# Patient Record
Sex: FEMALE | Race: WHITE | NOT HISPANIC OR LATINO | URBAN - NONMETROPOLITAN AREA
[De-identification: names, ages, dates, MRNs, and addresses within clinical notes are randomized per-mention and may not be internally consistent; named-entity substitution may affect disease eponyms.]

---

## 2024-07-16 ENCOUNTER — OFFICE VISIT (OUTPATIENT)
Dept: CARDIOLOGY | Facility: CLINIC | Age: 34
End: 2024-07-16
Payer: COMMERCIAL

## 2024-07-16 VITALS
BODY MASS INDEX: 41.04 KG/M2 | HEART RATE: 80 BPM | DIASTOLIC BLOOD PRESSURE: 82 MMHG | SYSTOLIC BLOOD PRESSURE: 130 MMHG | WEIGHT: 223 LBS | HEIGHT: 62 IN | OXYGEN SATURATION: 95 %

## 2024-07-16 DIAGNOSIS — R06.02 SHORTNESS OF BREATH: ICD-10-CM

## 2024-07-16 DIAGNOSIS — R00.2 PALPITATIONS: ICD-10-CM

## 2024-07-16 DIAGNOSIS — R07.2 PRECORDIAL PAIN: Primary | ICD-10-CM

## 2024-07-16 RX ORDER — NITROGLYCERIN 0.4 MG/1
TABLET SUBLINGUAL
Qty: 30 TABLET | Refills: 5 | Status: SHIPPED | OUTPATIENT
Start: 2024-07-16

## 2024-07-16 RX ORDER — MONTELUKAST SODIUM 10 MG/1
10 TABLET ORAL NIGHTLY
COMMUNITY

## 2024-07-16 RX ORDER — ATENOLOL 25 MG/1
12.5 TABLET ORAL DAILY
Qty: 15 TABLET | Refills: 4 | Status: SHIPPED | OUTPATIENT
Start: 2024-07-16

## 2024-07-16 NOTE — PROGRESS NOTES
"Erna SMITH is a 33 y.o. female who presents today for Establish Care.    CHIEF COMPLIANT  Chief Complaint   Patient presents with    Establish Care       Active Problems:  1.  Palpitations  1.1 Holter monitor placed by PCP showed no significant dysrhythmia.  There were episodes of sinus tachycardia with rates up to 158 bpm.    2.  Chest pain  3.  Dyspnea      HPI  The patient is a 33-year-old female that was referred to [facility] care for further cardiac evaluation due to recent episodes of palpitations and chest pain.  The patient states that several weeks ago she started noticing palpitations.  She would be in the car and her Apple watch would alert her that her heart rate was too high.  She would feel her heart was racing and her heart rate would be in the 130s.  The patient states all she would be doing is sitting in her car driving.  The patient then started having intermittent episodes of chest pain.  She states these episodes would \"hit all at once.\"  She states on a more severe episode she was having midsternal chest pain which did radiate down her arm.  She did have associated nausea and vomiting.  This happened when she was at work a few weeks ago and she did have to leave work.  She does work as a dispatcher.  The patient was evaluated in the emergency department due to chest pain symptoms and may.  Her labs were unremarkable.  EKG showed normal sinus rhythm with sinus arrhythmia with some nonspecific T wave abnormalities.  The patient has also noted increased dyspnea with activity.  She states this is also been going on for several weeks.  When the chest pain symptoms happen she has not noticed it triggered by anything.  She states that will occur randomly and will resolve without intervention.  EKG today shows normal sinus rhythm, rate 80 with some inferior ST changes noted.  The patient was placed on a Holter monitor by primary care which showed no evidence of significant dysrhythmia.  " There were episodes of sinus tachycardia with rates up to 158 bpm.  The patient states that she is being tested for sleep apnea by primary care and is waiting for this to be sent to her house.  We will follow-up on results.  PRIOR MEDS  Current Outpatient Medications on File Prior to Visit   Medication Sig Dispense Refill    albuterol sulfate  (90 Base) MCG/ACT inhaler       Fluticasone-Salmeterol (ADVAIR/WIXELA) 250-50 MCG/ACT DISKUS       ipratropium-albuterol (DUO-NEB) 0.5-2.5 mg/3 ml nebulizer Nebulize q 4 h prn wheezing / shortness of breath 360 mL 0    montelukast (SINGULAIR) 10 MG tablet Take 1 tablet by mouth Every Night.      amoxicillin-clavulanate (AUGMENTIN) 875-125 MG per tablet       cetirizine (zyrTEC) 10 MG tablet Take 1 tablet by mouth Daily. 30 tablet 0    fluticasone (FLONASE) 50 MCG/ACT nasal spray 1-2 sprays each nostril daily 16 g 0    ibuprofen (ADVIL,MOTRIN) 800 MG tablet 1 po q 8 h with food prn pain 30 tablet 0    ondansetron ODT (ZOFRAN-ODT) 4 MG disintegrating tablet Place 1 tablet under the tongue Every 8 (Eight) Hours As Needed for Nausea or Vomiting. 9 tablet 0    SUMAtriptan (IMITREX) 50 MG tablet       valACYclovir (VALTREX) 1000 MG tablet Take 1 tablet by mouth 3 (Three) Times a Day. 30 tablet 0     No current facility-administered medications on file prior to visit.       ALLERGIES  Loperamide    HISTORY  Past Medical History:   Diagnosis Date    Asthma     Migraine        Social History     Socioeconomic History    Marital status: Single   Tobacco Use    Smoking status: Never    Smokeless tobacco: Never   Vaping Use    Vaping status: Some Days   Substance and Sexual Activity    Sexual activity: Defer       History reviewed. No pertinent family history.    Review of Systems   Constitutional:  Negative for fever.   HENT:  Negative for congestion, postnasal drip, rhinorrhea, sinus pressure and sinus pain.    Respiratory:  Positive for chest tightness and shortness of breath.   "  Cardiovascular:  Positive for chest pain (dull pain, in the center of her chest), palpitations and leg swelling.   Gastrointestinal:  Positive for constipation.   Genitourinary: Negative.    Neurological:  Positive for headaches. Negative for dizziness and syncope.   Hematological:  Bruises/bleeds easily.   Psychiatric/Behavioral:  Positive for sleep disturbance (trouble staying asleep).        Objective     VITALS: /82 (BP Location: Left arm, Patient Position: Sitting, Cuff Size: Large Adult)   Pulse 80   Ht 157.5 cm (62.01\")   Wt 101 kg (223 lb)   SpO2 95%   BMI 40.78 kg/m²     LABS:   Lab Results (most recent)       None            IMAGING:   No Images in the past 120 days found..    EXAM:  Physical Exam  Constitutional:       Appearance: Normal appearance.   Eyes:      Pupils: Pupils are equal, round, and reactive to light.   Cardiovascular:      Rate and Rhythm: Normal rate and regular rhythm.      Pulses:           Carotid pulses are 2+ on the right side and 2+ on the left side.       Radial pulses are 2+ on the right side and 2+ on the left side.        Dorsalis pedis pulses are 2+ on the right side and 2+ on the left side.        Posterior tibial pulses are 2+ on the right side and 2+ on the left side.      Heart sounds: Normal heart sounds.   Pulmonary:      Effort: Pulmonary effort is normal.      Breath sounds: Normal breath sounds.   Abdominal:      General: Bowel sounds are normal.      Palpations: Abdomen is soft.   Musculoskeletal:      Right lower leg: No edema.      Left lower leg: No edema.   Skin:     General: Skin is warm and dry.      Capillary Refill: Capillary refill takes less than 2 seconds.   Neurological:      General: No focal deficit present.      Mental Status: She is alert and oriented to person, place, and time.   Psychiatric:         Mood and Affect: Mood normal.         Thought Content: Thought content normal.         Procedure     ECG 12 Lead    Date/Time: 7/16/2024 " 10:28 AM  Performed by: Ida Diaz APRN    Authorized by: Ida Diaz APRN  Rhythm: sinus rhythm  Rate: normal  BPM: 80  T inversion: III  T flattening: aVF  QRS axis: normal           Assessment & Plan    Diagnosis Plan   1. Precordial pain  ECG 12 Lead    Treadmill Stress Test    Adult Transthoracic Echo Complete W/ Cont if Necessary Per Protocol      2. Palpitations  Treadmill Stress Test    Adult Transthoracic Echo Complete W/ Cont if Necessary Per Protocol    atenolol (TENORMIN) 25 MG tablet      3. Shortness of breath  Treadmill Stress Test    Adult Transthoracic Echo Complete W/ Cont if Necessary Per Protocol        Plan:  1.  Precordial pain: We will schedule the patient for treadmill stress test for ischemic evaluation and echocardiogram to evaluate LV function and structural anatomy.  Will send in sublingual nitroglycerin to the patient's pharmacy.  We did review instructions on appropriate use and when to seek emergency treatment.  The patient does verbalize understanding.  2.  Palpitations: Will proceed with testing as described above.  The patient does continue to have episodes of tachycardia with rates up in the 130s at rest.  Will start the patient on atenolol 12.5 mg daily.  She was advised to monitor heart rate and blood pressure when starting this medication and to let us know if she has issues.  Labs in the emergency department were unremarkable.  3.  Shortness of breath: We will proceed with treadmill stress test and echocardiogram as discussed above.  Will plan to see the patient back to review results and evaluate ongoing symptoms.    Return in about 2 months (around 9/16/2024).    Katya CHAN SMITH  reports that she has never smoked. She has never used smokeless tobacco. I have educated her on the risk of diseases from using tobacco products such as cancer, COPD, and heart disease.     I advised her to quit and she is not willing to quit.    I spent 3  minutes counseling the  patient.                      MEDS ORDERED DURING VISIT:  New Medications Ordered This Visit   Medications    atenolol (TENORMIN) 25 MG tablet     Sig: Take 0.5 tablets by mouth Daily.     Dispense:  15 tablet     Refill:  4    nitroglycerin (NITROSTAT) 0.4 MG SL tablet     Si under the tongue as needed for angina, may repeat q5mins for up three doses     Dispense:  30 tablet     Refill:  5       DISCONTINUED MEDS DURING VISIT:   There are no discontinued medications.       This document has been electronically signed by CARLOS Viera  2024 13:04 EDT    Dictated Utilizing Dragon Dictation: Part of this note may be an electronic transcription/translation of spoken language to printed text using the Dragon Dictation System

## 2024-09-11 ENCOUNTER — HOSPITAL ENCOUNTER (OUTPATIENT)
Dept: CARDIOLOGY | Facility: HOSPITAL | Age: 34
Discharge: HOME | End: 2024-09-11
Admitting: CLINICAL NURSE SPECIALIST
Payer: COMMERCIAL

## 2024-09-11 VITALS — BODY MASS INDEX: 40.98 KG/M2 | WEIGHT: 222.66 LBS | HEIGHT: 62 IN

## 2024-09-11 DIAGNOSIS — R06.02 SHORTNESS OF BREATH: ICD-10-CM

## 2024-09-11 DIAGNOSIS — R07.2 PRECORDIAL PAIN: ICD-10-CM

## 2024-09-11 DIAGNOSIS — R00.2 PALPITATIONS: ICD-10-CM

## 2024-09-11 PROCEDURE — 93306 TTE W/DOPPLER COMPLETE: CPT

## 2024-09-15 LAB
AORTIC DIMENSIONLESS INDEX: 1 (DI)
BH CV ECHO MEAS - AO MAX PG: 5.6 MMHG
BH CV ECHO MEAS - AO MEAN PG: 3.3 MMHG
BH CV ECHO MEAS - AO ROOT DIAM: 2.9 CM
BH CV ECHO MEAS - AO V2 MAX: 117.9 CM/SEC
BH CV ECHO MEAS - AO V2 VTI: 26.1 CM
BH CV ECHO MEAS - EDV(CUBED): 117.2 ML
BH CV ECHO MEAS - EF_3D-VOL: 55 %
BH CV ECHO MEAS - ESV(CUBED): 33.1 ML
BH CV ECHO MEAS - FS: 34.4 %
BH CV ECHO MEAS - IVS/LVPW: 0.96 CM
BH CV ECHO MEAS - IVSD: 1.05 CM
BH CV ECHO MEAS - LA DIMENSION: 3.6 CM
BH CV ECHO MEAS - LAT PEAK E' VEL: 14.2 CM/SEC
BH CV ECHO MEAS - LV MASS(C)D: 191.5 GRAMS
BH CV ECHO MEAS - LV MAX PG: 5.7 MMHG
BH CV ECHO MEAS - LV MEAN PG: 2.6 MMHG
BH CV ECHO MEAS - LV V1 MAX: 118.9 CM/SEC
BH CV ECHO MEAS - LV V1 VTI: 26.6 CM
BH CV ECHO MEAS - LVIDD: 4.9 CM
BH CV ECHO MEAS - LVIDS: 3.2 CM
BH CV ECHO MEAS - LVPWD: 1.09 CM
BH CV ECHO MEAS - MED PEAK E' VEL: 8 CM/SEC
BH CV ECHO MEAS - MV A MAX VEL: 72.9 CM/SEC
BH CV ECHO MEAS - MV DEC SLOPE: 638.7 CM/SEC2
BH CV ECHO MEAS - MV DEC TIME: 0.19 SEC
BH CV ECHO MEAS - MV E MAX VEL: 64.6 CM/SEC
BH CV ECHO MEAS - MV E/A: 0.89
BH CV ECHO MEAS - MV MAX PG: 2.8 MMHG
BH CV ECHO MEAS - MV MEAN PG: 1.54 MMHG
BH CV ECHO MEAS - MV P1/2T: 44.3 MSEC
BH CV ECHO MEAS - MV V2 VTI: 19.8 CM
BH CV ECHO MEAS - MVA(P1/2T): 5 CM2
BH CV ECHO MEAS - PA V2 MAX: 107.2 CM/SEC
BH CV ECHO MEAS - PI END-D VEL: 75.3 CM/SEC
BH CV ECHO MEAS - RAP SYSTOLE: 8 MMHG
BH CV ECHO MEAS - RV MAX PG: 2.23 MMHG
BH CV ECHO MEAS - RV V1 MAX: 74.7 CM/SEC
BH CV ECHO MEAS - RV V1 VTI: 16.6 CM
BH CV ECHO MEAS - RVDD: 2.5 CM
BH CV ECHO MEAS - RVSP: 15.1 MMHG
BH CV ECHO MEAS - TAPSE (>1.6): 2.5 CM
BH CV ECHO MEAS - TR MAX PG: 7.1 MMHG
BH CV ECHO MEAS - TR MAX VEL: 133.3 CM/SEC
BH CV ECHO MEASUREMENTS AVERAGE E/E' RATIO: 5.82
BH CV XLRA - TDI S': 11.3 CM/SEC
SINUS: 2.9 CM

## 2024-09-16 ENCOUNTER — TELEPHONE (OUTPATIENT)
Dept: CARDIOLOGY | Facility: CLINIC | Age: 34
End: 2024-09-16
Payer: COMMERCIAL

## 2024-09-24 ENCOUNTER — HOSPITAL ENCOUNTER (OUTPATIENT)
Dept: CARDIOLOGY | Facility: HOSPITAL | Age: 34
Discharge: HOME | End: 2024-09-24
Admitting: CLINICAL NURSE SPECIALIST
Payer: COMMERCIAL

## 2024-09-24 DIAGNOSIS — R07.2 PRECORDIAL PAIN: ICD-10-CM

## 2024-09-24 DIAGNOSIS — R06.02 SHORTNESS OF BREATH: ICD-10-CM

## 2024-09-24 DIAGNOSIS — R00.2 PALPITATIONS: ICD-10-CM

## 2024-09-24 PROCEDURE — 93017 CV STRESS TEST TRACING ONLY: CPT

## 2024-09-27 LAB
BH CV STRESS DURATION MIN STAGE 1: 3
BH CV STRESS DURATION SEC STAGE 1: 0
BH CV STRESS GRADE STAGE 1: 10
BH CV STRESS METS STAGE 1: 5
BH CV STRESS PROTOCOL 1: NORMAL
BH CV STRESS RECOVERY BP: NORMAL MMHG
BH CV STRESS RECOVERY HR: 106 BPM
BH CV STRESS SPEED STAGE 1: 1.7
BH CV STRESS STAGE 1: 1
MAXIMAL PREDICTED HEART RATE: 186 BPM
PERCENT MAX PREDICTED HR: 91.94 %
STRESS BASELINE BP: NORMAL MMHG
STRESS BASELINE HR: 99 BPM
STRESS PERCENT HR: 108 %
STRESS POST ESTIMATED WORKLOAD: 10.1 METS
STRESS POST EXERCISE DUR MIN: 7 MIN
STRESS POST EXERCISE DUR SEC: 43 SEC
STRESS POST PEAK BP: NORMAL MMHG
STRESS POST PEAK HR: 171 BPM
STRESS TARGET HR: 158 BPM

## 2024-09-30 ENCOUNTER — TELEPHONE (OUTPATIENT)
Dept: CARDIOLOGY | Facility: CLINIC | Age: 34
End: 2024-09-30
Payer: COMMERCIAL

## 2024-09-30 NOTE — TELEPHONE ENCOUNTER
RELAY  STRESS  Called patient to notify of no acute findings or abnormalities. Keep follow up as scheduled. If you have any problem between now and then give our office a call.   ----- Message from Lola PAYTON sent at 9/30/2024  8:43 AM EDT -----    ----- Message -----  From: Ida Diaz APRN  Sent: 9/30/2024   8:15 AM EDT  To: Vale Gallego    No EKG evidence of ischemia.  No exercise induced dysrhythmia.

## 2024-10-01 ENCOUNTER — TELEPHONE (OUTPATIENT)
Dept: CARDIOLOGY | Facility: CLINIC | Age: 34
End: 2024-10-01
Payer: COMMERCIAL

## 2024-12-25 DIAGNOSIS — R00.2 PALPITATIONS: ICD-10-CM

## 2024-12-26 RX ORDER — ATENOLOL 25 MG/1
12.5 TABLET ORAL DAILY
Qty: 45 TABLET | Refills: 3 | Status: SHIPPED | OUTPATIENT
Start: 2024-12-26